# Patient Record
Sex: MALE | URBAN - METROPOLITAN AREA
[De-identification: names, ages, dates, MRNs, and addresses within clinical notes are randomized per-mention and may not be internally consistent; named-entity substitution may affect disease eponyms.]

---

## 2024-10-11 ENCOUNTER — TELEPHONE (OUTPATIENT)
Age: 49
End: 2024-10-11

## 2024-12-03 ENCOUNTER — OFFICE VISIT (OUTPATIENT)
Age: 49
End: 2024-12-03
Payer: COMMERCIAL

## 2024-12-03 ENCOUNTER — PREP FOR PROCEDURE (OUTPATIENT)
Age: 49
End: 2024-12-03

## 2024-12-03 ENCOUNTER — TELEPHONE (OUTPATIENT)
Age: 49
End: 2024-12-03

## 2024-12-03 VITALS
HEART RATE: 70 BPM | BODY MASS INDEX: 32.82 KG/M2 | DIASTOLIC BLOOD PRESSURE: 89 MMHG | OXYGEN SATURATION: 99 % | RESPIRATION RATE: 17 BRPM | SYSTOLIC BLOOD PRESSURE: 127 MMHG | WEIGHT: 247.6 LBS | HEIGHT: 73 IN

## 2024-12-03 DIAGNOSIS — R10.13 EPIGASTRIC PAIN: Primary | ICD-10-CM

## 2024-12-03 DIAGNOSIS — R14.2 BELCHING: ICD-10-CM

## 2024-12-03 DIAGNOSIS — R79.89 ELEVATED LFTS: ICD-10-CM

## 2024-12-03 DIAGNOSIS — Z12.11 ENCOUNTER FOR SCREENING COLONOSCOPY: ICD-10-CM

## 2024-12-03 DIAGNOSIS — Z59.86 FINANCIAL INSECURITY: ICD-10-CM

## 2024-12-03 DIAGNOSIS — Z12.11 ENCOUNTER FOR SCREENING COLONOSCOPY: Primary | ICD-10-CM

## 2024-12-03 DIAGNOSIS — R10.13 EPIGASTRIC PAIN: ICD-10-CM

## 2024-12-03 DIAGNOSIS — K21.9 GASTROESOPHAGEAL REFLUX DISEASE, UNSPECIFIED WHETHER ESOPHAGITIS PRESENT: ICD-10-CM

## 2024-12-03 LAB
ALBUMIN SERPL-MCNC: 4.2 G/DL (ref 3.5–5)
ALBUMIN/GLOB SERPL: 1.2 (ref 1–1.9)
ALP SERPL-CCNC: 121 U/L (ref 40–129)
ALT SERPL-CCNC: 20 U/L (ref 8–55)
AST SERPL-CCNC: 25 U/L (ref 15–37)
BILIRUB DIRECT SERPL-MCNC: <0.2 MG/DL (ref 0–0.4)
BILIRUB SERPL-MCNC: 0.9 MG/DL (ref 0–1.2)
GLOBULIN SER CALC-MCNC: 3.5 G/DL (ref 2.3–3.5)
PROT SERPL-MCNC: 7.6 G/DL (ref 6.3–8.2)

## 2024-12-03 PROCEDURE — 99204 OFFICE O/P NEW MOD 45 MIN: CPT | Performed by: PHYSICIAN ASSISTANT

## 2024-12-03 RX ORDER — SODIUM, POTASSIUM,MAG SULFATES 17.5-3.13G
1 SOLUTION, RECONSTITUTED, ORAL ORAL SEE ADMIN INSTRUCTIONS
Qty: 1 EACH | Refills: 0 | Status: SHIPPED | OUTPATIENT
Start: 2024-12-03

## 2024-12-03 RX ORDER — LISINOPRIL 10 MG/1
10 TABLET ORAL NIGHTLY
COMMUNITY
Start: 2024-12-02 | End: 2025-01-01

## 2024-12-03 RX ORDER — SODIUM CHLORIDE 0.9 % (FLUSH) 0.9 %
5-40 SYRINGE (ML) INJECTION PRN
Status: CANCELLED | OUTPATIENT
Start: 2024-12-03

## 2024-12-03 RX ORDER — SODIUM CHLORIDE 0.9 % (FLUSH) 0.9 %
5-40 SYRINGE (ML) INJECTION EVERY 12 HOURS SCHEDULED
Status: CANCELLED | OUTPATIENT
Start: 2024-12-03

## 2024-12-03 RX ORDER — SODIUM CHLORIDE 9 MG/ML
25 INJECTION, SOLUTION INTRAVENOUS PRN
Status: CANCELLED | OUTPATIENT
Start: 2024-12-03

## 2024-12-03 SDOH — ECONOMIC STABILITY - INCOME SECURITY: FINANCIAL INSECURITY: Z59.86

## 2024-12-03 NOTE — TELEPHONE ENCOUNTER
Patient in office, scheduled for colonoscopy and EGD with Dr. Bai 12/9/2024 at [facility]. Suprep prep instructions given to the patient in office. Sent a staff message to the nurse to send Suprep to the pharmacy on file.      Items to purchase 5 days before your procedure:    Suprep -  prescription at your pharmacy.  Purchase one 10oz bottle of Magnesium Citrate  Purchase Dulcolax Laxative tablets (Not stool softener tablets).      Two days before your procedure:      Drink at least eight glasses of water today.  Stop eating high- fiber foods such as vegetables and beans until after your colonoscopy. You can eat all other types of food today.     Drink the 10 oz bottle of magnesium citrate after dinner.      The day before your Colonoscopy:    Clear liquids only the entire day (water, Gatorade, coffee, tea, Sprite, 7-up, Jell-O, popsicles, chicken / beef broth, apple juice).    No milk / No dairy products, NO RED, BLUE or PURPLE color liquids /dyes    4:00 pm Take 2 Dulcolax tablets.     6:00 pm - Pour one 6 oz bottle of Suprep liquid into the mixing container. Add cold water to the 16-oz line and mix. Drink all the solution over 10-15 minutes.   Drink two more 16-ounce glasses of water over the next hour.      The day of your procedure:    6 hours prior to arrival time of your procedure, pour one 6 oz bottle of Suprep liquid into the mixing container. Add cold water to the 16-oz line and mix. Drink all the solution over 10-15 minutes. Drink two more 16-ounce glasses of water. Must finish drinking the final glass of water at least 4 hours prior to arrival time.    NOTHING TO EAT UNTIL AFTER YOUR PROCEDURE.    IMPORTANT:      If you do not follow these instructions, your colonoscopy could be canceled due to having an unclean prep.

## 2024-12-03 NOTE — TELEPHONE ENCOUNTER
\"Lizy Fuller Amanda, RN  Please send Suprep to the pharmacy on file. Thanks\"    ------------    As per standing order from Dr. Bai, Suprep sent to pharmacy on file.

## 2024-12-03 NOTE — PROGRESS NOTES
icterus.  Cardiovascular:      Rate and Rhythm: Normal rate and regular rhythm.      Heart sounds: No murmur heard.  Pulmonary:      Effort: Pulmonary effort is normal. No respiratory distress.      Breath sounds: Normal breath sounds.   Abdominal:      General: There is no distension.      Palpations: Abdomen is soft. There is no mass.      Tenderness: There is abdominal tenderness (mild discomfort LLQ and across upper abdomen). There is no guarding or rebound.      Hernia: No hernia is present.   Skin:     General: Skin is warm and dry.      Coloration: Skin is not jaundiced.   Neurological:      Mental Status: He is alert and oriented to person, place, and time.   Psychiatric:         Mood and Affect: Mood normal.         Behavior: Behavior normal.         Thought Content: Thought content normal.         Judgment: Judgment normal.              Return for scheduled EGD, scheduled colonoscopy, follow-up visit 1-2 weeks after procedure.            An electronic signature was used to authenticate this note.    --Melissa R Grinnell, PA-C

## 2024-12-03 NOTE — PERIOP NOTE
Patient verified name, , and procedure.    Patient gave verbal permission to complete assessment with his wife, Diana.    Type: 1a; abbreviated assessment per anesthesia guidelines    Labs per anesthesia: none.     Instructed that they will be notified the day before their procedure by the GI Lab for time of arrival if their procedure is [place] and Pre-op for [place] cases. Arrival times should be called by 5 pm. If no phone is received the patient should contact their respective hospital. The GI lab telephone number is [phone] and ES Pre-op is [phone].     Bring Inhaler     Follow diet and prep instructions per office. Please drink 32 ounces of non-caffeinated clear liquids 2 hours prior to your arrival to avoid dehydration.      Bath or shower the night before and the am of surgery with non-moisturizing soap. No lotions, oils, powders, cologne on skin. No make up, eye make up or jewelry. Wear loose fitting comfortable, clean clothing.     Must have adult present in building the entire time .     Medications for the day of procedure: NONE  Patient to hold nsaid's per anesthesia guidelines.     The following discharge instructions reviewed with patient's wife: medication given during procedure may cause drowsiness for several hours, therefore, do not drive or operate machinery for remainder of the day. You may not drink alcohol on the day of your procedure, please resume regular diet and activity unless otherwise directed. You may experience abdominal distention for several hours that is relieved by the passage of gas. Contact your physician if you have any of the following: fever or chills, severe abdominal pain or excessive amount of bleeding or a large amount when having a bowel movement. Occasional specks of blood with bowel movement would not be unusual.

## 2024-12-04 LAB — UREA BREATH TEST QL: NEGATIVE

## 2024-12-05 ENCOUNTER — TELEPHONE (OUTPATIENT)
Age: 49
End: 2024-12-05

## 2024-12-05 NOTE — TELEPHONE ENCOUNTER
Called spoke to pt's wife. Gave message from provider regarding results. She will relay message.Pt's wife verbalized understanding with no other questions or concerns.

## 2024-12-08 ENCOUNTER — ANESTHESIA EVENT (OUTPATIENT)
Dept: ENDOSCOPY | Age: 49
End: 2024-12-08
Payer: COMMERCIAL

## 2024-12-08 RX ORDER — SODIUM CHLORIDE 9 MG/ML
INJECTION, SOLUTION INTRAVENOUS PRN
Status: CANCELLED | OUTPATIENT
Start: 2024-12-08

## 2024-12-08 RX ORDER — SODIUM CHLORIDE 0.9 % (FLUSH) 0.9 %
5-40 SYRINGE (ML) INJECTION EVERY 12 HOURS SCHEDULED
Status: CANCELLED | OUTPATIENT
Start: 2024-12-08

## 2024-12-08 RX ORDER — SODIUM CHLORIDE, SODIUM LACTATE, POTASSIUM CHLORIDE, CALCIUM CHLORIDE 600; 310; 30; 20 MG/100ML; MG/100ML; MG/100ML; MG/100ML
INJECTION, SOLUTION INTRAVENOUS CONTINUOUS
Status: CANCELLED | OUTPATIENT
Start: 2024-12-08

## 2024-12-09 ENCOUNTER — CARE COORDINATION (OUTPATIENT)
Dept: CARE COORDINATION | Facility: CLINIC | Age: 49
End: 2024-12-09

## 2024-12-09 ENCOUNTER — ANESTHESIA (OUTPATIENT)
Dept: ENDOSCOPY | Age: 49
End: 2024-12-09
Payer: COMMERCIAL

## 2024-12-09 ENCOUNTER — HOSPITAL ENCOUNTER (OUTPATIENT)
Age: 49
Setting detail: OUTPATIENT SURGERY
Discharge: HOME | End: 2024-12-09
Attending: INTERNAL MEDICINE | Admitting: INTERNAL MEDICINE
Payer: COMMERCIAL

## 2024-12-09 VITALS
SYSTOLIC BLOOD PRESSURE: 119 MMHG | HEIGHT: 73 IN | OXYGEN SATURATION: 97 % | HEART RATE: 61 BPM | BODY MASS INDEX: 31.94 KG/M2 | TEMPERATURE: 98.6 F | WEIGHT: 241 LBS | DIASTOLIC BLOOD PRESSURE: 82 MMHG | RESPIRATION RATE: 14 BRPM

## 2024-12-09 PROCEDURE — 43249 ESOPH EGD DILATION <30 MM: CPT | Performed by: INTERNAL MEDICINE

## 2024-12-09 PROCEDURE — 3609027000 HC COLONOSCOPY: Performed by: INTERNAL MEDICINE

## 2024-12-09 PROCEDURE — 43239 EGD BIOPSY SINGLE/MULTIPLE: CPT | Performed by: INTERNAL MEDICINE

## 2024-12-09 PROCEDURE — C1726 CATH, BAL DIL, NON-VASCULAR: HCPCS | Performed by: INTERNAL MEDICINE

## 2024-12-09 PROCEDURE — 7100000010 HC PHASE II RECOVERY - FIRST 15 MIN: Performed by: INTERNAL MEDICINE

## 2024-12-09 PROCEDURE — 3609017700 HC EGD DILATION GASTRIC/DUODENAL STRICTURE: Performed by: INTERNAL MEDICINE

## 2024-12-09 PROCEDURE — 6360000002 HC RX W HCPCS: Performed by: NURSE ANESTHETIST, CERTIFIED REGISTERED

## 2024-12-09 PROCEDURE — 88305 TISSUE EXAM BY PATHOLOGIST: CPT

## 2024-12-09 PROCEDURE — 2709999900 HC NON-CHARGEABLE SUPPLY: Performed by: INTERNAL MEDICINE

## 2024-12-09 PROCEDURE — 45378 DIAGNOSTIC COLONOSCOPY: CPT | Performed by: INTERNAL MEDICINE

## 2024-12-09 PROCEDURE — 3700000001 HC ADD 15 MINUTES (ANESTHESIA): Performed by: INTERNAL MEDICINE

## 2024-12-09 PROCEDURE — 3700000000 HC ANESTHESIA ATTENDED CARE: Performed by: INTERNAL MEDICINE

## 2024-12-09 PROCEDURE — 7100000011 HC PHASE II RECOVERY - ADDTL 15 MIN: Performed by: INTERNAL MEDICINE

## 2024-12-09 RX ORDER — LIDOCAINE HYDROCHLORIDE 10 MG/ML
1 INJECTION, SOLUTION INFILTRATION; PERINEURAL
Status: DISCONTINUED | OUTPATIENT
Start: 2024-12-09 | End: 2024-12-09 | Stop reason: HOSPADM

## 2024-12-09 RX ORDER — SODIUM CHLORIDE 0.9 % (FLUSH) 0.9 %
5-40 SYRINGE (ML) INJECTION PRN
Status: DISCONTINUED | OUTPATIENT
Start: 2024-12-09 | End: 2024-12-09 | Stop reason: HOSPADM

## 2024-12-09 RX ORDER — SODIUM CHLORIDE 9 MG/ML
INJECTION, SOLUTION INTRAVENOUS PRN
Status: DISCONTINUED | OUTPATIENT
Start: 2024-12-09 | End: 2024-12-09 | Stop reason: HOSPADM

## 2024-12-09 RX ORDER — SODIUM CHLORIDE 0.9 % (FLUSH) 0.9 %
5-40 SYRINGE (ML) INJECTION EVERY 12 HOURS SCHEDULED
Status: DISCONTINUED | OUTPATIENT
Start: 2024-12-09 | End: 2024-12-09 | Stop reason: HOSPADM

## 2024-12-09 RX ORDER — LIDOCAINE HYDROCHLORIDE 20 MG/ML
INJECTION, SOLUTION EPIDURAL; INFILTRATION; INTRACAUDAL; PERINEURAL
Status: DISCONTINUED | OUTPATIENT
Start: 2024-12-09 | End: 2024-12-09 | Stop reason: SDUPTHER

## 2024-12-09 RX ORDER — PROPOFOL 10 MG/ML
INJECTION, EMULSION INTRAVENOUS
Status: DISCONTINUED | OUTPATIENT
Start: 2024-12-09 | End: 2024-12-09 | Stop reason: SDUPTHER

## 2024-12-09 RX ORDER — NALOXONE HYDROCHLORIDE 0.4 MG/ML
INJECTION, SOLUTION INTRAMUSCULAR; INTRAVENOUS; SUBCUTANEOUS PRN
Status: DISCONTINUED | OUTPATIENT
Start: 2024-12-09 | End: 2024-12-09 | Stop reason: HOSPADM

## 2024-12-09 RX ORDER — SODIUM CHLORIDE 9 MG/ML
25 INJECTION, SOLUTION INTRAVENOUS PRN
Status: DISCONTINUED | OUTPATIENT
Start: 2024-12-09 | End: 2024-12-09 | Stop reason: HOSPADM

## 2024-12-09 RX ADMIN — PROPOFOL 200 MCG/KG/MIN: 10 INJECTION, EMULSION INTRAVENOUS at 12:11

## 2024-12-09 RX ADMIN — PROPOFOL 100 MG: 10 INJECTION, EMULSION INTRAVENOUS at 12:12

## 2024-12-09 RX ADMIN — PROPOFOL 100 MG: 10 INJECTION, EMULSION INTRAVENOUS at 12:10

## 2024-12-09 RX ADMIN — LIDOCAINE HYDROCHLORIDE 100 MG: 20 INJECTION, SOLUTION EPIDURAL; INFILTRATION; INTRACAUDAL; PERINEURAL at 12:10

## 2024-12-09 ASSESSMENT — PAIN - FUNCTIONAL ASSESSMENT
PAIN_FUNCTIONAL_ASSESSMENT: NONE - DENIES PAIN
PAIN_FUNCTIONAL_ASSESSMENT: 0-10

## 2024-12-09 ASSESSMENT — PAIN SCALES - GENERAL
PAINLEVEL_OUTOF10: 0

## 2024-12-09 NOTE — ANESTHESIA PRE PROCEDURE
Department of Anesthesiology  Preprocedure Note       Name:  Jey Wilkinson   Age:  49 y.o.  :  1975                                          MRN:  [id number]         Date:  2024      Surgeon: Surgeon(s):  Nazario Bai MD    Procedure: Procedure(s):  COLORECTAL CANCER SCREENING, NOT HIGH RISK  ESOPHAGOGASTRODUODENOSCOPY    Medications prior to admission:   Prior to Admission medications    Medication Sig Start Date End Date Taking? Authorizing Provider   lisinopril (PRINIVIL;ZESTRIL) 10 MG tablet Take 1 tablet by mouth at bedtime 24 Yes Provider, MD Marium   sodium-potassium-mag sulfate (SUPREP BOWEL PREP KIT) 17.5-3.13-1.6 GM/177ML SOLN solution Take 1 kit by mouth See Admin Instructions -- Refer to colonoscopy prep instructions from [facility]. Call [phone] with any questions. 12/3/24  Yes Nazario Bai MD       Current medications:    Current Facility-Administered Medications   Medication Dose Route Frequency Provider Last Rate Last Admin   • lidocaine 1 % injection 1 mL  1 mL IntraDERmal Once PRN Kenny Oreilly IV, MD       • sodium chloride flush 0.9 % injection 5-40 mL  5-40 mL IntraVENous PRN Kenny Oreilly IV, MD       • sodium chloride flush 0.9 % injection 5-40 mL  5-40 mL IntraVENous 2 times per day Nazario Bai MD       • sodium chloride flush 0.9 % injection 5-40 mL  5-40 mL IntraVENous PRN Nazario Bai MD       • 0.9 % sodium chloride infusion  25 mL IntraVENous PRN Nazario Bai MD           Allergies:  No Known Allergies    Problem List:    Patient Active Problem List   Diagnosis Code   • Epigastric pain R10.13   • Belching R14.2   • Gastroesophageal reflux disease K21.9   • Elevated LFTs R79.89   • Encounter for screening colonoscopy Z12.11       Past Medical History:        Diagnosis Date   • Asthma     inhaler prn--instructed to bring   • Cigarette smoker     1 pack/day since age 16   • Elevated LFTs

## 2024-12-09 NOTE — PERIOP NOTE
Discharge instructions reviewed with pt and family member who verbalize understanding of follow up care.    intact

## 2024-12-09 NOTE — H&P
32.59 kg/m²     - GENERAL: Alert and oriented x 3. No acute distress. Well-nourished.    - EYES: EOMI. Anicteric.    - HENT: Moist mucous membranes. No cervical lymphadenopathy.    - LUNGS: Clear to auscultation bilaterally. No accessory muscle use.    - CARDIOVASCULAR: Regular rate and rhythm. No murmur. No JVD.    - ABDOMEN: Soft, non-tender and non-distended. No palpable masses.    - EXTREMITIES: No edema. Non-tender.?SKIN: No rashes or lesions. Warm.    - NEUROLOGIC: No focal neurological deficits. CN II-XII grossly intact, but not individually tested.    - PSYCHIATRIC: Cooperative. Appropriate mood and affect.    - SKIN: No rashes, sores, or lesions.     - RECTAL: Deferred.     Labs      No results found for this or any previous visit (from the past 24 hour(s)).     Imaging/Diagnostics Last 24 Hours     See my note above, if applicable.       Assessment & Plan:   EGD and colon for abd pain, belching, GERD and screening

## 2024-12-09 NOTE — CARE COORDINATION
SHUKRI CM attempted to reach pt regarding GI referral for financial assistance.  No answer-VM full.  Will try again alter this week.

## 2024-12-09 NOTE — ANESTHESIA POSTPROCEDURE EVALUATION
Department of Anesthesiology  Postprocedure Note    Patient: Jey Wilkinson  MRN: [id number]  YOB: 1975  Date of evaluation: 12/9/2024    Procedure Summary       Date: 12/09/24 [place] [facility] [place] [facility]    Anesthesia Start: 1207 Anesthesia Stop: 1241    Procedures:       COLORECTAL CANCER SCREENING, NOT HIGH RISK      ESOPHAGOGASTRODUODENOSCOPY DILATION BALLOON AND BIOPSY (Upper GI Region) Diagnosis:       Epigastric pain      Belching      Gastroesophageal reflux disease, unspecified whether esophagitis present      Elevated LFTs      Encounter for screening colonoscopy      (Epigastric pain [R10.13])      (Belching [R14.2])      (Gastroesophageal reflux disease, unspecified whether esophagitis present [K21.9])      (Elevated LFTs [R79.89])      (Encounter for screening colonoscopy [Z12.11])    Surgeons: Nazario Bai MD Responsible Provider: Andrea Avendano MD    Anesthesia Type: TIVA ASA Status: 2            Anesthesia Type: No value filed.    Venus Phase I:      Venus Phase II: Venus Score: 7    Anesthesia Post Evaluation    Patient location during evaluation: PACU  Patient participation: complete - patient participated  Level of consciousness: awake and alert  Airway patency: patent  Nausea & Vomiting: no nausea and no vomiting  Cardiovascular status: hemodynamically stable  Respiratory status: acceptable, nonlabored ventilation and spontaneous ventilation  Hydration status: euvolemic  Comments: /67   Pulse 83   Temp 98.6 °F (37 °C) (Temporal)   Resp 24   Ht 1.854 m (6' 1\")   Wt 109.3 kg (241 lb)   SpO2 91%   BMI 31.80 kg/m²     Multimodal analgesia pain management approach  Pain management: adequate and satisfactory to patient    No notable events documented.

## 2024-12-09 NOTE — DISCHARGE INSTRUCTIONS
Gastrointestinal Esophagogastroduodenoscopy (EGD) and Colonoscopy- Discharge Instructions    1. Call Dr. Bai for any problems or questions.    2. Contact the doctor's office for follow up appointment as directed.    3. Medication may cause drowsiness for several hours, therefore, do not drive or operate machinery for remainder of the day.    4. No alcohol today.    5. Do not make any important decisions such as signing legal paperwork.    6. Ordinarily, you may resume regular diet and activity after exam unless otherwise specified by your physician.    7. For mild soreness in your throat you may use Cepacol throat lozenges or warm  salt-water gargles as needed.    8. Because of air put into your colon during exam, you may experience some abdominal distension, relieved by the passage of gas, for several hours.    9. Contact your physician if you have any of the following:  Excessive amount of bleeding - large amount when having a bowel movement.  Occasional specks of blood with bowel movement would not be unusual.  Severe abdominal pain  Fever or Chills        Any additional instructions:   Impression:         -  Normal esophagus.         - Biopsies were taken with a cold forceps for evaluation of eosinophilic            esophagitis. Esophageal balloon dilation performed up to 19mm.         -  Gastritis, characterized by erythema.  Biopsied.         -  Normal examined duodenum.  Recommendation:         -  Await pathology results.         -  Use Prilosec (omeprazole) 20 mg PO BID.         -  Return to my office as previously scheduled.         -  Continue present medications.         -  The patient will be observed post-procedure, until all discharge criteria            are met.    Impression:         -  The entire examined colon is normal.         -  No specimens collected.  Recommendation:         -  Repeat colonoscopy in 10 years for screening purposes.

## 2024-12-10 ENCOUNTER — CARE COORDINATION (OUTPATIENT)
Dept: CARE COORDINATION | Facility: CLINIC | Age: 49
End: 2024-12-10

## 2024-12-17 ENCOUNTER — CARE COORDINATION (OUTPATIENT)
Dept: CARE COORDINATION | Facility: CLINIC | Age: 49
End: 2024-12-17

## 2024-12-17 NOTE — CARE COORDINATION
VM remains full.  Pt unable to be reached.  SHUKRI BRAR removed from care team but can call SHUKRI BRAR at [phone] anytime.

## 2024-12-18 ENCOUNTER — TELEPHONE (OUTPATIENT)
Age: 49
End: 2024-12-18

## 2024-12-18 NOTE — TELEPHONE ENCOUNTER
Contacted patient regarding results. Left voicemail for patient to return call if any questions or concerns. Recall placed. Also left voicemail for patient to call back if he would like to scheduled follow up with merlyn.

## 2025-01-02 PROBLEM — Z12.11 ENCOUNTER FOR SCREENING COLONOSCOPY: Status: RESOLVED | Noted: 2024-12-03 | Resolved: 2025-01-02

## 2025-02-11 ENCOUNTER — OFFICE VISIT (OUTPATIENT)
Dept: ORTHOPEDIC SURGERY | Age: 50
End: 2025-02-11
Payer: COMMERCIAL

## 2025-02-11 VITALS — WEIGHT: 241 LBS | BODY MASS INDEX: 31.94 KG/M2 | HEIGHT: 73 IN

## 2025-02-11 DIAGNOSIS — M54.12 CERVICAL RADICULOPATHY: ICD-10-CM

## 2025-02-11 DIAGNOSIS — M25.511 ACUTE PAIN OF RIGHT SHOULDER: Primary | ICD-10-CM

## 2025-02-11 DIAGNOSIS — M47.812 CERVICAL SPONDYLOSIS: ICD-10-CM

## 2025-02-11 PROCEDURE — 99204 OFFICE O/P NEW MOD 45 MIN: CPT | Performed by: STUDENT IN AN ORGANIZED HEALTH CARE EDUCATION/TRAINING PROGRAM

## 2025-02-11 RX ORDER — PREDNISONE 10 MG/1
50 TABLET ORAL DAILY
Qty: 25 TABLET | Refills: 0 | Status: SHIPPED | OUTPATIENT
Start: 2025-02-11 | End: 2025-02-11

## 2025-02-11 RX ORDER — PREDNISONE 50 MG/1
50 TABLET ORAL DAILY
Qty: 5 TABLET | Refills: 0 | Status: SHIPPED | OUTPATIENT
Start: 2025-02-11 | End: 2025-02-16

## 2025-02-11 NOTE — PROGRESS NOTES
Name: Jey Wilkinson  YOB: 1975  Gender: male  MRN: [id number]      CC: New Patient (R Shoulder)       HPI: Jey Wilkinson is a 49 y.o. male who presents with New Patient (R Shoulder)    Patient has right shoulder pain radiating down into the hand.  He has pins and needle sensation.  He does have neck pain and stiffness as well.  There is no injury.  He has tried over-the-counter medicines.  He has had neck problems and left arm issues which were similar in the past.    ROS/Meds/PSH/PMH/FH/SH: I personally reviewed the patients standard intake form.  Below are the pertinents    Tobacco:  reports that he has been smoking cigarettes. He has been exposed to tobacco smoke. He has never used smokeless tobacco.  Denies diabetes    Physical Examination:  General: no acute distress  Lungs: breathing easily    R Shoulder Exam  No gross deformity or atrophy.  AROM: flexion 180, abduction 180, ER 70, IR T10  PROM: abduction 180  Resisted strength testing 5/5 with ER, IR and abduction   Empty can negative  Corbin and Neers positive   Cross arm negative  Yergason and Speeds negative  Tender LHB, nontender AC, clavicle.   TTP upper trap, periscapular mm.   SILT in axillary distribution and distally. Radial pulse 2+, WWP.  Neck ROM slow and slightly restricted, TTP spinous processes, Spurling positive.         Imaging:   XR with AP, Grashey, and axillary views of R shoulder unremarkable  XR C spine AP lateral show facet arthritis.     All imaging interpreted by myself independent of radiology review        Assessment:     ICD-10-CM    1. Acute pain of right shoulder  M25.511 XR SHOULDER RIGHT (MIN 2 VIEWS)     XR CERVICAL SPINE (2-3 VIEWS)      2. Cervical radiculopathy  M54.12 Amb External Referral To Physical Therapy     Ambulatory referral to Orthopedic Surgery      3. Cervical spondylosis  M47.812 Amb External Referral To Physical Therapy     Ambulatory referral to Orthopedic Surgery

## 2025-02-17 ENCOUNTER — TELEPHONE (OUTPATIENT)
Dept: ORTHOPEDIC SURGERY | Age: 50
End: 2025-02-17

## 2025-02-17 NOTE — TELEPHONE ENCOUNTER
Called patient to let him know we will speak with Chyna to see if he suggests doing an injection for his shoulder. Informed patient that I will call him back tomorrow once I speak to Chyna.

## 2025-02-17 NOTE — TELEPHONE ENCOUNTER
Wanting to know if he can get an injection in rt shoulder. He isn't able to do physical therapy at this time due to work and financially

## 2025-02-18 ENCOUNTER — TELEPHONE (OUTPATIENT)
Dept: ORTHOPEDIC SURGERY | Age: 50
End: 2025-02-18

## 2025-02-18 NOTE — TELEPHONE ENCOUNTER
Called patient and let him know that Chyna didn't recommend a shoulder injection at this time. Chyna recommended he see cervical first and see if they thought an injection would be beneficial for him. Patient stated understanding and said he would speak with cervical team at his appt.

## (undated) DEVICE — ESOPHAGEAL/COLONIC/BILIARY WIREGUIDED BALLOON DILATATION CATHETER: Brand: CRE™ PRO

## (undated) DEVICE — ENDOSCOPIC KIT 1.1+ OP4 CA DE 2 GWN AAMI LEVEL 3

## (undated) DEVICE — SINGLE PORT MANIFOLD: Brand: NEPTUNE 2

## (undated) DEVICE — YANKAUER,BULB TIP,W/O VENT,RIGID,STERILE: Brand: MEDLINE

## (undated) DEVICE — NEEDLE SYRINGE 18GA L1.5IN RED PLAS HUB S STL BLNT FILL W/O

## (undated) DEVICE — SYRINGE INFL 60ML DISP ALLIANCE II

## (undated) DEVICE — AIRLIFE™ OXYGEN TUBING 7 FEET (2.1 M) CRUSH RESISTANT OXYGEN TUBING, VINYL TIPPED: Brand: AIRLIFE™

## (undated) DEVICE — FORCEPS BX L240CM JAW DIA2.8MM L CAP W/ NDL MIC MESH TOOTH

## (undated) DEVICE — MOUTHPIECE ENDOSCP L CTRL OPN AND SIDE PORTS DISP

## (undated) DEVICE — GAUZE,SPONGE,4"X4",12PLY,WOVEN,NS,LF: Brand: MEDLINE

## (undated) DEVICE — SYRINGE MEDICAL 3ML CLEAR PLASTIC STANDARD NON CONTROL LUERLOCK TIP DISPOSABLE

## (undated) DEVICE — KENDALL RADIOLUCENT FOAM MONITORING ELECTRODE RECTANGULAR SHAPE: Brand: KENDALL

## (undated) DEVICE — CONTAINER FORMALIN PREFILLED 10% NBF 60ML

## (undated) DEVICE — SYRINGE MED 10ML LUERLOCK TIP W/O SFTY DISP

## (undated) DEVICE — CONNECTOR TBNG OD5-7MM O2 END DISP

## (undated) DEVICE — BLOCK BITE AD 60FR W/ VELC STRP ADDRESSES MOST PT AND